# Patient Record
Sex: FEMALE | Race: WHITE | NOT HISPANIC OR LATINO | Employment: OTHER | URBAN - METROPOLITAN AREA
[De-identification: names, ages, dates, MRNs, and addresses within clinical notes are randomized per-mention and may not be internally consistent; named-entity substitution may affect disease eponyms.]

---

## 2018-07-03 ENCOUNTER — OFFICE VISIT (OUTPATIENT)
Dept: URGENT CARE | Facility: PHYSICIAN GROUP | Age: 73
End: 2018-07-03
Payer: MEDICARE

## 2018-07-03 VITALS
BODY MASS INDEX: 24.84 KG/M2 | WEIGHT: 135 LBS | DIASTOLIC BLOOD PRESSURE: 60 MMHG | SYSTOLIC BLOOD PRESSURE: 112 MMHG | HEIGHT: 62 IN | RESPIRATION RATE: 14 BRPM | TEMPERATURE: 97.9 F | HEART RATE: 82 BPM | OXYGEN SATURATION: 98 %

## 2018-07-03 DIAGNOSIS — M10.9 ACUTE GOUT OF LEFT ANKLE, UNSPECIFIED CAUSE: ICD-10-CM

## 2018-07-03 PROCEDURE — 99203 OFFICE O/P NEW LOW 30 MIN: CPT | Performed by: PHYSICIAN ASSISTANT

## 2018-07-03 RX ORDER — BUSPIRONE HYDROCHLORIDE 5 MG/1
5 TABLET ORAL 3 TIMES DAILY
COMMUNITY

## 2018-07-03 RX ORDER — COLCHICINE 0.6 MG/1
TABLET ORAL
Qty: 9 TAB | Refills: 0 | Status: SHIPPED | OUTPATIENT
Start: 2018-07-03

## 2018-07-03 RX ORDER — HYDROCODONE BITARTRATE AND ACETAMINOPHEN 10; 325 MG/1; MG/1
1-2 TABLET ORAL EVERY 6 HOURS PRN
COMMUNITY

## 2018-07-03 RX ORDER — ROPINIROLE 0.5 MG/1
0.5 TABLET, FILM COATED ORAL
COMMUNITY

## 2018-07-03 ASSESSMENT — ENCOUNTER SYMPTOMS
FALLS: 0
COUGH: 0
EYE REDNESS: 0
CHILLS: 0
EYE DISCHARGE: 0
JOINT SWELLING: 1
BACK PAIN: 0
MYALGIAS: 0
TINGLING: 0
FEVER: 0
SORE THROAT: 0
VOMITING: 0
WHEEZING: 0

## 2018-07-03 ASSESSMENT — PAIN SCALES - GENERAL: PAINLEVEL: 5=MODERATE PAIN

## 2018-07-03 NOTE — PROGRESS NOTES
"Subjective:      Renetta Wong is a 72 y.o. female who presents with Gout (Left ankle, x 14 days)            Patient is a 72-year-old female who presents today with persistent count after original flare 2 weeks ago.  Patient does report that she traveling on her RV at this time and reports seeing an urgent care 2 weeks ago for a gout flare in her left ankle.  Patient was started on steroids and given an injection of Toradol of which significantly helped with symptoms however she reports that she feels that the flare is coming back the last few days.  Originally patient was unable to walk and the swelling was much more prominent.  She does report history of gout into her ankles mostly.  She denies any fevers, injury or trauma.  Of note patient denies any kidney or liver abnormalities.  Patient has taken colchicine in the past with significant relief of symptoms.      Joint Swelling   This is a new problem. The current episode started 1 to 4 weeks ago. The problem occurs constantly. The problem has been waxing and waning. Associated symptoms include joint swelling. Pertinent negatives include no chills, congestion, coughing, fever, myalgias, sore throat or vomiting. Exacerbated by: Pressure over the area or walking. Treatments tried: As above. The treatment provided mild relief.       Review of Systems   Constitutional: Negative for chills and fever.   HENT: Negative for congestion and sore throat.    Eyes: Negative for discharge and redness.   Respiratory: Negative for cough and wheezing.    Cardiovascular: Negative for leg swelling.   Gastrointestinal: Negative for vomiting.   Musculoskeletal: Positive for joint pain and joint swelling. Negative for back pain, falls and myalgias.   Skin: Negative for itching.   Neurological: Negative for tingling.   All other systems reviewed and are negative.         Objective:     /60   Pulse 82   Temp 36.6 °C (97.9 °F)   Resp 14   Ht 1.575 m (5' 2\")   Wt 61.2 " kg (135 lb)   SpO2 98%   BMI 24.69 kg/m²    PMH:  has no past medical history on file.  MEDS:   Current Outpatient Prescriptions:   •  METHOTREXATE PO, Take 18 mg by mouth every 7 days., Disp: , Rfl:   •  HYDROcodone/acetaminophen (NORCO)  MG Tab, Take 1-2 Tabs by mouth every 6 hours as needed., Disp: , Rfl:   •  ROPINIRole (REQUIP) 0.5 MG Tab, Take 0.5 mg by mouth 1 time daily as needed., Disp: , Rfl:   •  busPIRone (BUSPAR) 5 MG Tab, Take 5 mg by mouth 3 times a day., Disp: , Rfl:   •  colchicine (COLCRYS) 0.6 MG Tab, Take 1.2mg PO x1, then 06 mg PO 1 hour later. Do not take more than 1.8mg daily., Disp: 9 Tab, Rfl: 0  ALLERGIES: No Known Allergies  SURGHX: No past surgical history on file.  SOCHX:  reports that she has been smoking.  She has never used smokeless tobacco. She reports that she does not drink alcohol or use drugs.  FH: Family history was reviewed, no pertinent findings to report    Physical Exam   Constitutional: She is oriented to person, place, and time. She appears well-developed and well-nourished. No distress.   HENT:   Head: Normocephalic and atraumatic.   Right Ear: External ear normal.   Eyes: Conjunctivae and EOM are normal. Pupils are equal, round, and reactive to light.   Neck: Normal range of motion. Neck supple. No tracheal deviation present.   Cardiovascular: Normal rate.    Pulmonary/Chest: Effort normal.   Musculoskeletal:        Left foot: There is tenderness and swelling. There is no bony tenderness.        Feet:    Noted edema to the medial aspect of the left ankle.  Full range of motion with dorsi plantar flexion and rotation.  Mild erythema with slight increased warmth.  Neurovascularly intact distally.  Without joint effusion.  Without specific bony step-off or tenderness.   Neurological: She is alert and oriented to person, place, and time. Coordination normal.   Skin: Skin is warm. No rash noted. There is erythema.   Psychiatric: She has a normal mood and affect. Her  behavior is normal. Judgment and thought content normal.   Vitals reviewed.              Assessment/Plan:     1. Acute gout of left ankle, unspecified cause  - colchicine (COLCRYS) 0.6 MG Tab; Take 1.2mg PO x1, then 06 mg PO 1 hour later. Do not take more than 1.8mg daily.  Dispense: 9 Tab; Refill: 0    Diet changes were discussed.  Increase fluid intake.  If not significantly improving within the next few days patient was strongly encouraged to follow-up with urgent care as patient travels.  Patient given precautionary s/sx that mandate immediate follow up and evaluation in the ED. Advised of risks of not doing so.    DDX, Supportive care, and indications for immediate follow-up discussed with patient.    Instructed to return to clinic or nearest emergency department if we are not available for any change in condition, further concerns, or worsening of symptoms.    The patient demonstrated a good understanding and agreed with the treatment plan.  Please note that this dictation was created using voice recognition software. I have made every reasonable attempt to correct obvious errors, but I expect that there are errors of grammar and possibly content that I did not discover before finalizing the note.